# Patient Record
Sex: FEMALE | Race: OTHER | NOT HISPANIC OR LATINO
[De-identification: names, ages, dates, MRNs, and addresses within clinical notes are randomized per-mention and may not be internally consistent; named-entity substitution may affect disease eponyms.]

---

## 2019-10-30 ENCOUNTER — APPOINTMENT (OUTPATIENT)
Dept: SURGERY | Facility: CLINIC | Age: 24
End: 2019-10-30
Payer: MEDICAID

## 2019-10-30 PROCEDURE — 99203 OFFICE O/P NEW LOW 30 MIN: CPT

## 2019-11-05 PROBLEM — Z00.00 ENCOUNTER FOR PREVENTIVE HEALTH EXAMINATION: Status: ACTIVE | Noted: 2019-11-05

## 2021-02-17 ENCOUNTER — APPOINTMENT (OUTPATIENT)
Dept: SURGERY | Facility: CLINIC | Age: 26
End: 2021-02-17

## 2021-04-21 ENCOUNTER — NON-APPOINTMENT (OUTPATIENT)
Age: 26
End: 2021-04-21

## 2021-04-21 ENCOUNTER — APPOINTMENT (OUTPATIENT)
Dept: SURGERY | Facility: CLINIC | Age: 26
End: 2021-04-21
Payer: MEDICAID

## 2021-04-21 VITALS
HEIGHT: 63 IN | WEIGHT: 160 LBS | DIASTOLIC BLOOD PRESSURE: 92 MMHG | SYSTOLIC BLOOD PRESSURE: 133 MMHG | BODY MASS INDEX: 28.35 KG/M2

## 2021-04-21 PROCEDURE — 99072 ADDL SUPL MATRL&STAF TM PHE: CPT

## 2021-04-21 PROCEDURE — 99212 OFFICE O/P EST SF 10 MIN: CPT

## 2021-04-21 NOTE — PLAN
[FreeTextEntry1] : MRCP to r/out choledocholithiasis \par F/u after above to plan for cholecystectomy\par Order given at [facility]\par Low fat diet\par F/u after above

## 2021-04-21 NOTE — HISTORY OF PRESENT ILLNESS
[de-identified] : Pt is a 26 y.o F with no significant pmhx who presents today feeling well here for evalaution of gallstones. States she started having RUQ pn in January and underwent abdominal US confirming gallstones. States she has pain 3x per week with associated nausea, denies fevers or vomiting. US reviewed, small stones noted in GB with questionable stones in CBD. Pt to go for MRCP to further evaluate and will follow low fat diet (reviewed with pt) until her next appt after the imaging. Pt to call if symptoms worsen in the interim.

## 2021-08-18 ENCOUNTER — APPOINTMENT (OUTPATIENT)
Dept: SURGERY | Facility: CLINIC | Age: 26
End: 2021-08-18
Payer: MEDICAID

## 2021-08-18 VITALS
HEIGHT: 63 IN | WEIGHT: 161 LBS | DIASTOLIC BLOOD PRESSURE: 92 MMHG | SYSTOLIC BLOOD PRESSURE: 131 MMHG | BODY MASS INDEX: 28.53 KG/M2

## 2021-08-18 PROCEDURE — 99212 OFFICE O/P EST SF 10 MIN: CPT

## 2021-08-18 NOTE — HISTORY OF PRESENT ILLNESS
[de-identified] : Pt is a 27 y/o F with pmhx of cholelithiasis who presents today feeling well here for f/u on gallstones. Pt last seen in April and was sent for MRCP for evalaution of possible choledocholithiasis noted on abd US. Pt has appt scheduled for 8/26 and will f/u 1 week after test to review results and plan for sx. Pt states she has been experiencing nausea and RUQ pain during most days of the week and she is interested in sx. Denies any fevers or vomiting.

## 2021-08-18 NOTE — PLAN
[FreeTextEntry1] : MRC 8/28\par F/u after to review results and discuss plans for cholecystectomy\par Obtain US from [facility]

## 2021-09-21 ENCOUNTER — APPOINTMENT (OUTPATIENT)
Dept: SURGERY | Facility: CLINIC | Age: 26
End: 2021-09-21
Payer: MEDICAID

## 2021-09-21 VITALS
HEIGHT: 63 IN | WEIGHT: 160.5 LBS | TEMPERATURE: 96.3 F | DIASTOLIC BLOOD PRESSURE: 87 MMHG | OXYGEN SATURATION: 97 % | BODY MASS INDEX: 28.44 KG/M2 | HEART RATE: 75 BPM | SYSTOLIC BLOOD PRESSURE: 138 MMHG

## 2021-09-21 PROCEDURE — 99212 OFFICE O/P EST SF 10 MIN: CPT

## 2021-09-21 NOTE — ADDENDUM
[FreeTextEntry1] : This note was written by Ernestina Bañuelos on 09/21/2021 acting as scribe for RICHIE Curtis.

## 2021-09-21 NOTE — END OF VISIT
[FreeTextEntry3] : All medical record entries made by the [place] were at my, RICHIE Curtis, discretion and personally dictated by me on 09/21/2021 . I have reviewed the chart and agree that the record accurately reflects my personal performance of the history, physical exam, assessment and plan. I have also personally directed, reviewed and agreed to the chart.

## 2021-09-21 NOTE — ASSESSMENT
[FreeTextEntry1] : Pt is a 27 y/o F with PMHx of cholelithiasis who is interested in a cholecystectomy due to frequent symptoms. MRCP and MRI abdomen 8/28/21 cholelithiasis, multiple lobulated hypervascular lesions seen throughout the liver. Will order liver MRI with Eovist contrast for further investigation. Will also obtain patient's US from [place] [facility]. I reminded pt to avoid fatty foods so she will not have gallbladder symptoms. Pt will follow up after to discuss results and determine plan of care.

## 2021-09-21 NOTE — PLAN
[FreeTextEntry1] : Avoid fatty foods for symptom prevention\par Obtain abd US from [facility]\par MRI with contrast\par F/u after above to discuss surgical intervention

## 2021-09-21 NOTE — HISTORY OF PRESENT ILLNESS
[de-identified] : Pt is a 27 y/o F with PMHx of cholelithiasis who presents today feeling well here for follow up on gallstones. Pt states she doesn't currently have any abdominal pain but had RUQ pain yesterday. Reports she's been symptomatic for almost 2 years and symptoms are becoming more severe and frequent. She denies nausea, vomiting, fevers. Pt reports interest in a cholecystectomy. \par MRCP and MRI abdomen 8/28/21 cholelithiasis, multiple lobulated hypervascular lesions seen throughout the liver. Further evaluation with MR imaging of the liver with contrast was suggested by radiology.

## 2021-10-22 ENCOUNTER — APPOINTMENT (OUTPATIENT)
Dept: BARIATRICS | Facility: CLINIC | Age: 26
End: 2021-10-22
Payer: MEDICAID

## 2021-10-22 VITALS
HEART RATE: 95 BPM | WEIGHT: 159 LBS | BODY MASS INDEX: 28.17 KG/M2 | SYSTOLIC BLOOD PRESSURE: 131 MMHG | DIASTOLIC BLOOD PRESSURE: 97 MMHG | HEIGHT: 63 IN | OXYGEN SATURATION: 100 % | TEMPERATURE: 97.6 F

## 2021-10-22 DIAGNOSIS — R16.0 HEPATOMEGALY, NOT ELSEWHERE CLASSIFIED: ICD-10-CM

## 2021-10-22 PROCEDURE — 99212 OFFICE O/P EST SF 10 MIN: CPT

## 2021-10-22 NOTE — ADDENDUM
[FreeTextEntry1] : This note was written by Ernestina Bañuelos on 10/22/2021 acting as scribe for RICHIE Curtis.

## 2021-10-22 NOTE — HISTORY OF PRESENT ILLNESS
[de-identified] : Pt is a 25 y/o F with PMHx of cholelithiasis who presents today feeling well here for follow up on gallstones. She reports an episode of severe abdominal pain two days ago that radiated down her legs and to her back. She states her abdomen was distended and painful to palpation. Today, pt reports she no longer has severe pain but has constant mild abdominal pain. She denies nausea, vomiting, fevers. She states she's been eating very healthy but endorses occasional nausea after eating. Pt did an MRI with contrast two days ago at [facility] but the results aren't available yet.

## 2021-10-22 NOTE — ASSESSMENT
[FreeTextEntry1] : Pt is a 25 y/o F with PMHx of cholelithiasis who presents today for follow up on gallstones after episode of severe abdominal pain two days ago and with c/o constant mild abdominal pain even with diet modification. Pt did an MRI with contrast two days ago at [facility] but the results aren't available yet. Will contact patient with results when they are available. Given patient's frequent symptoms, I recommended a cholecystectomy, pending MRI results. Will contact pt with MRI results and to give surgery date next week.

## 2021-10-22 NOTE — END OF VISIT
[FreeTextEntry3] : All medical record entries made by the Scribe were at my, RICHIE Curtis, discretion and personally dictated by me on 10/22/2021 . I have reviewed the chart and agree that the record accurately reflects my personal performance of the history, physical exam, assessment and plan. I have also personally directed, reviewed and agreed to the chart.

## 2021-10-22 NOTE — PLAN
[FreeTextEntry1] : Obtain MRI results from [facility] - report pending today\par Contact pt to discuss MRI and possibly schedule cholecystectomy

## 2022-01-25 ENCOUNTER — APPOINTMENT (OUTPATIENT)
Dept: BARIATRICS | Facility: CLINIC | Age: 27
End: 2022-01-25
Payer: MEDICAID

## 2022-01-25 VITALS
HEIGHT: 63 IN | HEART RATE: 87 BPM | OXYGEN SATURATION: 99 % | TEMPERATURE: 96.5 F | SYSTOLIC BLOOD PRESSURE: 148 MMHG | WEIGHT: 163.06 LBS | BODY MASS INDEX: 28.89 KG/M2 | DIASTOLIC BLOOD PRESSURE: 99 MMHG

## 2022-01-25 PROCEDURE — 99213 OFFICE O/P EST LOW 20 MIN: CPT

## 2022-01-25 NOTE — ADDENDUM
[FreeTextEntry1] : This note was written by Ernestina Bañuelos on 01/25/2022 acting as scribe for Dr. Velazquez

## 2022-01-25 NOTE — HISTORY OF PRESENT ILLNESS
cardene gtt at 3mg /hr. Pt has not slept this evening. Intermittent confusion but cooperative. [de-identified] : Pt is a 25 y/o F with PMHx of cholelithiasis who presents today feeling well here for follow up on gallstones. She is doing well overall today and denies abdominal or gallstone pain. MRI demonstrates numerous foci of benign focal nodular hyperplasia within the liver. Pt's PCP is Dr. Kassy Guerin

## 2022-01-25 NOTE — ASSESSMENT
[FreeTextEntry1] : Pt is a 27 y/o F feeling well here for follow up on gallstones. MRI demonstrates numerous foci of benign focal nodular hyperplasia within the liver. I advised pt to speak to her PCP about the MRI findings. Given patient's h/o cholelithiasis and symptomatic gallstones, I recommend a cholecystectomy.  The risks, benefits, and alternatives to the proposed procedure were discussed with the patient and all questions were answered to their satisfaction. Will schedule cholecystectomy and obtain necessary medical clearance.

## 2022-01-25 NOTE — END OF VISIT
[FreeTextEntry3] : All medical record entries made by the Scribe were at my, Dr. Velazquez's, discretion and personally dictated by me on 01/25/2022. I have reviewed the chart and agree that the record accurately reflects my personal performance of the history, physical exam, assessment and plan. I have also personally directed, reviewed and agreed to the chart.

## 2022-03-31 ENCOUNTER — APPOINTMENT (OUTPATIENT)
Dept: BARIATRICS | Facility: CLINIC | Age: 27
End: 2022-03-31
Payer: MEDICAID

## 2022-03-31 VITALS
DIASTOLIC BLOOD PRESSURE: 93 MMHG | OXYGEN SATURATION: 99 % | TEMPERATURE: 97.6 F | BODY MASS INDEX: 29.59 KG/M2 | HEART RATE: 79 BPM | HEIGHT: 63 IN | WEIGHT: 167 LBS | SYSTOLIC BLOOD PRESSURE: 143 MMHG

## 2022-03-31 PROCEDURE — 99214 OFFICE O/P EST MOD 30 MIN: CPT

## 2022-04-28 RX ORDER — CHLORHEXIDINE GLUCONATE 213 G/1000ML
1 SOLUTION TOPICAL DAILY
Refills: 0 | Status: DISCONTINUED | OUTPATIENT
Start: 2022-04-29 | End: 2022-04-29

## 2022-04-28 NOTE — ASU PATIENT PROFILE, ADULT - BRAND OF FIRST COVID-19 BOOSTER
You can access the FollowMyHealth Patient Portal offered by [facility] by registering at the following website: [email or web address]. By joining [facility]’s FollowMyHealth portal, you will also be able to view your health information using other applications (apps) compatible with our system.
[employer]

## 2022-04-28 NOTE — ASU PATIENT PROFILE, ADULT - LANGUAGE ASSISTANCE NEEDED
Pt states ok to answer RNs preop questions in English/No-Patient/Caregiver offered and refused free interpretation services.

## 2022-04-28 NOTE — ASU PATIENT PROFILE, ADULT - FALL HARM RISK - UNIVERSAL INTERVENTIONS
Bed in lowest position, wheels locked, appropriate side rails in place/Call bell, personal items and telephone in reach/Instruct patient to call for assistance before getting out of bed or chair/Non-slip footwear when patient is out of bed/[place] to call system/Physically safe environment - no spills, clutter or unnecessary equipment/Purposeful Proactive Rounding/Room/bathroom lighting operational, light cord in reach

## 2022-04-29 ENCOUNTER — APPOINTMENT (OUTPATIENT)
Dept: SURGERY | Facility: AMBULATORY SURGERY CENTER | Age: 27
End: 2022-04-29

## 2022-04-29 ENCOUNTER — TRANSCRIPTION ENCOUNTER (OUTPATIENT)
Age: 27
End: 2022-04-29

## 2022-04-29 ENCOUNTER — RESULT REVIEW (OUTPATIENT)
Age: 27
End: 2022-04-29

## 2022-04-29 ENCOUNTER — OUTPATIENT (OUTPATIENT)
Dept: OUTPATIENT SERVICES | Facility: HOSPITAL | Age: 27
LOS: 1 days | Discharge: HOME | End: 2022-04-29
Payer: MEDICAID

## 2022-04-29 VITALS
RESPIRATION RATE: 16 BRPM | TEMPERATURE: 97 F | SYSTOLIC BLOOD PRESSURE: 132 MMHG | HEART RATE: 94 BPM | OXYGEN SATURATION: 98 % | DIASTOLIC BLOOD PRESSURE: 83 MMHG

## 2022-04-29 VITALS
DIASTOLIC BLOOD PRESSURE: 93 MMHG | TEMPERATURE: 98 F | HEART RATE: 86 BPM | OXYGEN SATURATION: 100 % | HEIGHT: 64 IN | SYSTOLIC BLOOD PRESSURE: 141 MMHG | RESPIRATION RATE: 18 BRPM | WEIGHT: 166.23 LBS

## 2022-04-29 LAB — SARS-COV-2 RNA SPEC QL NAA+PROBE: NEGATIVE — SIGNIFICANT CHANGE UP

## 2022-04-29 PROCEDURE — 88304 TISSUE EXAM BY PATHOLOGIST: CPT | Mod: 26

## 2022-04-29 PROCEDURE — 47563 LAPARO CHOLECYSTECTOMY/GRAPH: CPT

## 2022-04-29 DEVICE — LIGATING CLIPS WECK HEMOLOK POLYMER MEDIUM-LARGE (GREEN) 6: Type: IMPLANTABLE DEVICE | Status: FUNCTIONAL

## 2022-04-29 RX ORDER — APREPITANT 80 MG/1
40 CAPSULE ORAL ONCE
Refills: 0 | Status: COMPLETED | OUTPATIENT
Start: 2022-04-29 | End: 2022-04-29

## 2022-04-29 RX ORDER — ATENOLOL 25 MG/1
1 TABLET ORAL
Qty: 0 | Refills: 0 | DISCHARGE

## 2022-04-29 RX ORDER — ONDANSETRON 8 MG/1
4 TABLET, FILM COATED ORAL ONCE
Refills: 0 | Status: DISCONTINUED | OUTPATIENT
Start: 2022-04-29 | End: 2022-04-29

## 2022-04-29 RX ORDER — ACETAMINOPHEN 500 MG
1000 TABLET ORAL ONCE
Refills: 0 | Status: COMPLETED | OUTPATIENT
Start: 2022-04-29 | End: 2022-04-29

## 2022-04-29 RX ORDER — AMLODIPINE BESYLATE 2.5 MG/1
1 TABLET ORAL
Qty: 0 | Refills: 0 | DISCHARGE

## 2022-04-29 RX ORDER — FENTANYL CITRATE 50 UG/ML
25 INJECTION INTRAVENOUS
Refills: 0 | Status: DISCONTINUED | OUTPATIENT
Start: 2022-04-29 | End: 2022-04-29

## 2022-04-29 RX ORDER — OXYCODONE HYDROCHLORIDE 5 MG/1
5 TABLET ORAL ONCE
Refills: 0 | Status: DISCONTINUED | OUTPATIENT
Start: 2022-04-29 | End: 2022-04-29

## 2022-04-29 RX ORDER — SODIUM CHLORIDE 9 MG/ML
1000 INJECTION, SOLUTION INTRAVENOUS
Refills: 0 | Status: DISCONTINUED | OUTPATIENT
Start: 2022-04-29 | End: 2022-04-29

## 2022-04-29 RX ORDER — FENTANYL CITRATE 50 UG/ML
50 INJECTION INTRAVENOUS ONCE
Refills: 0 | Status: DISCONTINUED | OUTPATIENT
Start: 2022-04-29 | End: 2022-04-29

## 2022-04-29 RX ORDER — OXYCODONE HYDROCHLORIDE 5 MG/1
1 TABLET ORAL
Qty: 12 | Refills: 0
Start: 2022-04-29 | End: 2022-05-01

## 2022-04-29 RX ORDER — KETOROLAC TROMETHAMINE 30 MG/ML
30 SYRINGE (ML) INJECTION ONCE
Refills: 0 | Status: DISCONTINUED | OUTPATIENT
Start: 2022-04-29 | End: 2022-04-29

## 2022-04-29 RX ADMIN — FENTANYL CITRATE 50 MICROGRAM(S): 50 INJECTION INTRAVENOUS at 13:55

## 2022-04-29 RX ADMIN — Medication 30 MILLIGRAM(S): at 14:18

## 2022-04-29 RX ADMIN — SODIUM CHLORIDE 100 MILLILITER(S): 9 INJECTION, SOLUTION INTRAVENOUS at 16:16

## 2022-04-29 RX ADMIN — CHLORHEXIDINE GLUCONATE 1 APPLICATION(S): 213 SOLUTION TOPICAL at 10:45

## 2022-04-29 RX ADMIN — APREPITANT 40 MILLIGRAM(S): 80 CAPSULE ORAL at 11:09

## 2022-04-29 RX ADMIN — Medication 30 MILLIGRAM(S): at 14:33

## 2022-04-29 RX ADMIN — OXYCODONE HYDROCHLORIDE 5 MILLIGRAM(S): 5 TABLET ORAL at 16:55

## 2022-04-29 RX ADMIN — Medication 1000 MILLIGRAM(S): at 11:09

## 2022-04-29 RX ADMIN — FENTANYL CITRATE 50 MICROGRAM(S): 50 INJECTION INTRAVENOUS at 13:40

## 2022-04-29 NOTE — BRIEF OPERATIVE NOTE - OPERATION/FINDINGS
Robot docked. Pt placed in reverse Trendelenburg. Omental attachments to GB were gently swept away. GB fundus retracted superiorly over dome of liver. Filmy adhesions between the GB & omentum/duo cauterized. GB infundibulum retracted laterally towards RLQ exposing Calot’s triangle. Critical view of safety obtained. Cystic duct and artery clipped and divided. Peritoneal attachments btw GB & liver bed  w/ electrocautery. Hemostasis achieved. No leakage of bile from cystic duct stump.

## 2022-04-29 NOTE — ASU DISCHARGE PLAN (ADULT/PEDIATRIC) - NS MD DC FALL RISK RISK
For information on Fall & Injury Prevention, visit: [email or web address] OR  [email or web address] OR  https://www.cdc.gov/steadi/patient.html

## 2022-04-29 NOTE — ASU DISCHARGE PLAN (ADULT/PEDIATRIC) - PAIN MANAGEMENT
sent to [facility] [address]/Prescription called to pharmacy/Take over the counter pain medication

## 2022-04-29 NOTE — ASU DISCHARGE PLAN (ADULT/PEDIATRIC) - CARE PROVIDER_API CALL
Tomás Chapa)  Surgery  [address] [address]  Phone: [phone]  Fax: [phone]  Follow Up Time: 2 weeks

## 2022-05-05 ENCOUNTER — APPOINTMENT (OUTPATIENT)
Dept: SURGERY | Facility: CLINIC | Age: 27
End: 2022-05-05

## 2022-05-12 PROBLEM — G43.909 MIGRAINE, UNSPECIFIED, NOT INTRACTABLE, WITHOUT STATUS MIGRAINOSUS: Chronic | Status: ACTIVE | Noted: 2022-04-28

## 2022-05-12 PROBLEM — K29.70 GASTRITIS, UNSPECIFIED, WITHOUT BLEEDING: Chronic | Status: ACTIVE | Noted: 2022-04-28

## 2022-05-15 LAB — SURGICAL PATHOLOGY STUDY: SIGNIFICANT CHANGE UP

## 2022-05-19 ENCOUNTER — APPOINTMENT (OUTPATIENT)
Dept: BARIATRICS | Facility: CLINIC | Age: 27
End: 2022-05-19
Payer: MEDICAID

## 2022-05-19 VITALS
TEMPERATURE: 97.6 F | SYSTOLIC BLOOD PRESSURE: 125 MMHG | WEIGHT: 161 LBS | HEART RATE: 88 BPM | DIASTOLIC BLOOD PRESSURE: 88 MMHG | BODY MASS INDEX: 28.53 KG/M2 | HEIGHT: 63 IN | OXYGEN SATURATION: 100 %

## 2022-05-19 DIAGNOSIS — K80.20 CALCULUS OF GALLBLADDER W/OUT CHOLECYSTITIS W/OUT OBSTRUCTION: ICD-10-CM

## 2022-05-19 PROCEDURE — 99024 POSTOP FOLLOW-UP VISIT: CPT

## 2022-05-19 NOTE — PHYSICAL EXAM
[Normal] : affect appropriate [de-identified] : incisions healing well, no evidence of infection, dermabond intact

## 2022-05-19 NOTE — HISTORY OF PRESENT ILLNESS
[de-identified] : Pt is a 28 y/o 1 week s/p lap cholecystectomy who presents today for post op appt feeling well. Denies any fevers, chest pn, sob, calf pain, n,v,c,d. States she has some soreness by her umbilicus but otherwise feeling well and denies any other abd pain. States she is tolerating her diet, reviewed following low fat diet now after sx which she understands. States she is walking around at home for exercise. Reports having a BM since sx. States her incisions are feeling ok with no bleeding or oozing.

## 2022-06-07 NOTE — ASU PREOP CHECKLIST - IV STARTED
Pt calling back in  Pt is still using [facility]   Reviewed how to use  States has had thrush in the past  Went away for 4-5 days and then came back white spot in baby's mouth  Got a fever a few days later and breast was very sore and   Left breast   Never was reddened  Was hard under side of breast  Last fever on Saturday evening 102.8  Was very achy but that has improved  No further fevers or body aches  Breast  but not as bad, feels that plugged areas are improving  Breast isn't as sore but does feel like there are plugged areas  Left side was biggest , now can only get 1/2 oz out (was getting 2 oz)  Discussed dangle nursing / pumping   Discussed how to relieve plugged ducts  Reviewed yeast treatment and washing / sterilizing   Pt states she doesn't want to take antibiotics yet since things are improving due to likely to prolong yeast / thrush  Reviewed using ice packs 20 min on and off and take ibuprofen 600 mg every 6 hours or 800 mg every 8 hours  She will do this and try to get the plugged area out overnight  If things do not improve overnight, she will let us know as may then need antibiotics  Pt will call with update 6/8/2022     no by [facility]/yes

## 2023-04-12 NOTE — ASU PATIENT PROFILE, ADULT - NSICDXNOPASTSURGICALHX_GEN_ALL_CORE
Render Risk Assessment In Note?: no Detail Level: Simple Additional Notes: Diagnosed with Noah Ryaninocente henderson. Likely started as lesions relation to diagnosis, but due to chronic scratching have thickened over time. Advised to leave areas alone. Additional Notes: Followed by pulmonary and urology. Currently stable other than new lesions appearing. She does have some lesion on face that we could treat with cautery but discussed scarring and she defers for now. <-- Click to add NO significant Past Surgical History

## (undated) DEVICE — SUT MONOCRYL 4-0 27" PS-2 UNDYED

## (undated) DEVICE — XI DRAPE COLUMN

## (undated) DEVICE — XI OBTURATOR OPTICAL BLADELESS 8MM

## (undated) DEVICE — TROCAR COVIDIEN VERSAPORT BLADELESS OPTICAL 12MM STANDARD

## (undated) DEVICE — XI TIP COVER

## (undated) DEVICE — VENODYNE/SCD SLEEVE CALF MEDIUM

## (undated) DEVICE — PACK GENERAL LAPAROSCOPY

## (undated) DEVICE — DRSG DERMABOND 0.7ML

## (undated) DEVICE — XI OBTURATOR ROBOTIC BLADELESS 12MM

## (undated) DEVICE — TROCAR COVIDIEN VERSAONE FIXATION CANNULA 5MM

## (undated) DEVICE — INSUFFLATION NDL COVIDIEN SURGINEEDLE VERESS 150MM LONG

## (undated) DEVICE — ENDOCATCH 10MM SPECIMEN POUCH

## (undated) DEVICE — MONOPOLAR CORD HI FREQ DISPOSABLE

## (undated) DEVICE — ELCTR BOVIE PENCIL BLADE 10FT

## (undated) DEVICE — TIP METZENBAUM SCISSOR MONOPOLAR ENDOCUT (ORANGE)

## (undated) DEVICE — XI ENDOWRIST 12 - 8 MM CANNULA REDUCER

## (undated) DEVICE — GLV 7.5 PROTEXIS (WHITE)

## (undated) DEVICE — XI ARM SCISSOR MONO CURVED

## (undated) DEVICE — XI DRAPE ARM

## (undated) DEVICE — XI ENDOWRIST SUCTION IRRIGATOR 8MM

## (undated) DEVICE — WARMING BLANKET UPPER ADULT

## (undated) DEVICE — SUT VICRYL 0 27" UR-6

## (undated) DEVICE — BLADE SURGICAL #15 CARBON

## (undated) DEVICE — XI SEAL UNIV 5- 8 MM

## (undated) DEVICE — TUBING STRYKER PNEUMOCLEAR HEAT HUMID

## (undated) DEVICE — XI ARM FORCEP CADIERE 8MM

## (undated) DEVICE — Device

## (undated) DEVICE — XI ARM CLIP APPLIER MEDIUM-LARGE

## (undated) DEVICE — TROCAR COVIDIEN VERSAPORT BLADELESS OPTICAL 5MM STANDARD

## (undated) DEVICE — XI ARM FORCEP MARYLAND BIPOLAR

## (undated) DEVICE — D HELP - CLEARVIEW CLEARIFY SYSTEM